# Patient Record
Sex: MALE | Race: WHITE | URBAN - METROPOLITAN AREA
[De-identification: names, ages, dates, MRNs, and addresses within clinical notes are randomized per-mention and may not be internally consistent; named-entity substitution may affect disease eponyms.]

---

## 2018-04-19 ENCOUNTER — APPOINTMENT (OUTPATIENT)
Age: 83
Setting detail: DERMATOLOGY
End: 2018-04-20

## 2018-04-19 VITALS
HEIGHT: 70 IN | SYSTOLIC BLOOD PRESSURE: 156 MMHG | DIASTOLIC BLOOD PRESSURE: 74 MMHG | WEIGHT: 183 LBS | HEART RATE: 74 BPM

## 2018-04-19 VITALS — HEART RATE: 68 BPM | SYSTOLIC BLOOD PRESSURE: 151 MMHG | DIASTOLIC BLOOD PRESSURE: 73 MMHG

## 2018-04-19 PROBLEM — C44.42 SQUAMOUS CELL CARCINOMA OF SKIN OF SCALP AND NECK: Status: ACTIVE | Noted: 2018-04-19

## 2018-04-19 PROCEDURE — OTHER PRESCRIPTION: OTHER

## 2018-04-19 PROCEDURE — OTHER MOHS SURGERY: OTHER

## 2018-04-19 PROCEDURE — 17311 MOHS 1 STAGE H/N/HF/G: CPT

## 2018-04-19 PROCEDURE — 17312 MOHS ADDL STAGE: CPT

## 2018-04-19 PROCEDURE — 14021 TIS TRNFR S/A/L 10.1-30 SQCM: CPT

## 2018-04-19 PROCEDURE — OTHER CONSULTATION FOR MOHS SURGERY: OTHER

## 2018-04-19 RX ORDER — CEPHALEXIN 500 MG/1
CAPSULE ORAL
Qty: 20 | Refills: 0 | Status: ERX | COMMUNITY
Start: 2018-04-19

## 2018-04-19 NOTE — PROCEDURE: MOHS SURGERY
Stage 8: Additional Anesthesia Type: 1% lidocaine with 1:100,000 epinephrine and a 1:10 solution of 8.4% sodium bicarbonate

## 2018-04-19 NOTE — PROCEDURE: CONSULTATION FOR MOHS SURGERY
Name Of The Referring Provider For Procedure: Dr. Barboza
Body Location Override (Optional - Billing Will Still Be Based On Selected Body Map Location If Applicable): scalp
Detail Level: Detailed
Size Of Lesion: 2
X Size Of Lesion In Cm (Optional): 1.7
Location Indication Override (Is Already Calculated Based On Selected Body Location): Area M
Incorporate Mauc In Note: Yes

## 2018-04-19 NOTE — PROCEDURE: MOHS SURGERY
Body Location Override (Optional - Billing Will Still Be Based On Selected Body Map Location If Applicable): scalp

## 2018-05-07 ENCOUNTER — APPOINTMENT (OUTPATIENT)
Age: 83
Setting detail: DERMATOLOGY
End: 2018-05-08

## 2018-05-07 VITALS — DIASTOLIC BLOOD PRESSURE: 65 MMHG | HEIGHT: 70.5 IN | WEIGHT: 181 LBS | SYSTOLIC BLOOD PRESSURE: 132 MMHG

## 2018-05-07 DIAGNOSIS — Z48.817 ENCOUNTER FOR SURGICAL AFTERCARE FOLLOWING SURGERY ON THE SKIN AND SUBCUTANEOUS TISSUE: ICD-10-CM

## 2018-05-07 PROCEDURE — OTHER PATIENT SPECIFIC COUNSELING: OTHER

## 2018-05-07 PROCEDURE — OTHER ORDER TESTS: OTHER

## 2018-05-07 PROCEDURE — OTHER MIPS QUALITY: OTHER

## 2018-05-07 PROCEDURE — OTHER COUNSELING: OTHER

## 2018-05-07 ASSESSMENT — LOCATION DETAILED DESCRIPTION DERM: LOCATION DETAILED: RIGHT SUPERIOR OCCIPITAL SCALP

## 2018-05-07 ASSESSMENT — LOCATION ZONE DERM: LOCATION ZONE: SCALP

## 2018-05-07 ASSESSMENT — PAIN INTENSITY VAS: HOW INTENSE IS YOUR PAIN 0 BEING NO PAIN, 10 BEING THE MOST SEVERE PAIN POSSIBLE?: NO PAIN

## 2018-05-07 ASSESSMENT — LOCATION SIMPLE DESCRIPTION DERM: LOCATION SIMPLE: POSTERIOR SCALP

## 2018-05-07 NOTE — PROCEDURE: PATIENT SPECIFIC COUNSELING
Due to current mild dehiscence, suture removal has been deferred for two days to reduce risk of total wound dehiscence. Culture has been performed to eliminate and determine any potential infection. Continue to follow wound care until suture removal on May 9, 2018.
Detail Level: Detailed

## 2018-05-08 ENCOUNTER — RX ONLY (RX ONLY)
Age: 83
End: 2018-05-08

## 2018-05-08 RX ORDER — MUPIROCIN 20 MG/G
OINTMENT TOPICAL
Qty: 1 | Refills: 1 | Status: ERX | COMMUNITY
Start: 2018-05-08

## 2018-05-09 ENCOUNTER — APPOINTMENT (OUTPATIENT)
Age: 83
Setting detail: DERMATOLOGY
End: 2018-05-14

## 2018-05-09 VITALS
HEIGHT: 72 IN | HEART RATE: 64 BPM | DIASTOLIC BLOOD PRESSURE: 62 MMHG | WEIGHT: 181 LBS | SYSTOLIC BLOOD PRESSURE: 124 MMHG

## 2018-05-09 DIAGNOSIS — Z48.817 ENCOUNTER FOR SURGICAL AFTERCARE FOLLOWING SURGERY ON THE SKIN AND SUBCUTANEOUS TISSUE: ICD-10-CM

## 2018-05-09 PROCEDURE — OTHER PATIENT SPECIFIC COUNSELING: OTHER

## 2018-05-09 PROCEDURE — OTHER TREATMENT REGIMEN: OTHER

## 2018-05-09 PROCEDURE — OTHER COUNSELING: OTHER

## 2018-05-09 PROCEDURE — OTHER MIPS QUALITY: OTHER

## 2018-05-09 ASSESSMENT — PAIN INTENSITY VAS: HOW INTENSE IS YOUR PAIN 0 BEING NO PAIN, 10 BEING THE MOST SEVERE PAIN POSSIBLE?: NO PAIN

## 2018-05-09 ASSESSMENT — LOCATION SIMPLE DESCRIPTION DERM: LOCATION SIMPLE: POSTERIOR SCALP

## 2018-05-09 ASSESSMENT — LOCATION ZONE DERM: LOCATION ZONE: SCALP

## 2018-05-09 ASSESSMENT — LOCATION DETAILED DESCRIPTION DERM: LOCATION DETAILED: RIGHT OCCIPITAL SCALP

## 2018-05-09 NOTE — PROCEDURE: PATIENT SPECIFIC COUNSELING
Due to mild wound dehiscence, suture removal has been deferred. Patient is scheduled to follow up for potential suture removal Friday May 9, 2018. Instructed to continue wound care with hydrogen peroxide, followed by application of mupirocin 2% ointment and a bandage once daily. Advised to contact office in the event that the wound begin to omit symptoms such as pain, itching, warmth to the touch or odorous discharge.
Detail Level: Detailed

## 2018-05-10 ENCOUNTER — RX ONLY (RX ONLY)
Age: 83
End: 2018-05-10

## 2018-05-10 RX ORDER — SULFAMETHOXAZOLE AND TRIMETHOPRIM 400; 80 MG/1; MG/1
TABLET ORAL
Qty: 20 | Refills: 0 | Status: ERX | COMMUNITY
Start: 2018-05-10

## 2018-05-11 ENCOUNTER — APPOINTMENT (OUTPATIENT)
Age: 83
Setting detail: DERMATOLOGY
End: 2018-05-14

## 2018-05-11 VITALS
HEART RATE: 62 BPM | WEIGHT: 181 LBS | HEIGHT: 71 IN | DIASTOLIC BLOOD PRESSURE: 73 MMHG | SYSTOLIC BLOOD PRESSURE: 149 MMHG

## 2018-05-11 DIAGNOSIS — K6811 OTHER POSTOPERATIVE INFECTION: ICD-10-CM

## 2018-05-11 DIAGNOSIS — T814XXA OTHER POSTOPERATIVE INFECTION: ICD-10-CM

## 2018-05-11 PROBLEM — T81.4XXD INFECTION FOLLOWING A PROCEDURE, SUBSEQUENT ENCOUNTER: Status: ACTIVE | Noted: 2018-05-11

## 2018-05-11 PROCEDURE — OTHER MIPS QUALITY: OTHER

## 2018-05-11 PROCEDURE — OTHER SUTURE REMOVAL (GLOBAL PERIOD): OTHER

## 2018-05-11 PROCEDURE — OTHER PATIENT SPECIFIC COUNSELING: OTHER

## 2018-05-11 PROCEDURE — OTHER DRESSING CHANGE: OTHER

## 2018-05-11 PROCEDURE — OTHER TREATMENT REGIMEN: OTHER

## 2018-05-11 ASSESSMENT — LOCATION DETAILED DESCRIPTION DERM: LOCATION DETAILED: MID-OCCIPITAL SCALP

## 2018-05-11 ASSESSMENT — LOCATION ZONE DERM: LOCATION ZONE: SCALP

## 2018-05-11 ASSESSMENT — LOCATION SIMPLE DESCRIPTION DERM: LOCATION SIMPLE: POSTERIOR SCALP

## 2018-05-11 NOTE — PROCEDURE: TREATMENT REGIMEN
Detail Level: Zone
Continue Regimen: Bactrim 400 mg-80 mg tablet twice daily by mouth for ten days \\nmupirocin 2% ointment once daily to wound on scalp

## 2018-05-11 NOTE — PROCEDURE: PATIENT SPECIFIC COUNSELING
Explained to patient that there is slight dehiscence in the center of the wound and advised that he exercise caution when sleeping and dressing the wound. Sutures are to be removed today and patient is to continue his oral antibiotic course as well as wound care with use of topical mupirocin 2% ointment daily. Wound is growing Enterobacter aerogenes (normally gut melissa) susceptible to Bactrim.  The site will be re-evaluated in one week.
Detail Level: Detailed

## 2018-05-11 NOTE — PROCEDURE: SUTURE REMOVAL (GLOBAL PERIOD)
Detail Level: Detailed
Add 14281 Cpt? (Important Note: In 2017 The Use Of 78486 Is Being Tracked By Cms To Determine Future Global Period Reimbursement For Global Periods): no

## 2018-05-11 NOTE — PROCEDURE: DRESSING CHANGE
Add 30918 Cpt? (Important Note: In 2017 The Use Of 59078 Is Being Tracked By Cms To Determine Future Global Period Reimbursement For Global Periods): no
Wound Evaluated By: Dr. Monroy
Detail Level: Detailed

## 2018-05-16 ENCOUNTER — APPOINTMENT (OUTPATIENT)
Age: 83
Setting detail: DERMATOLOGY
End: 2018-05-19

## 2018-05-16 VITALS
HEART RATE: 68 BPM | DIASTOLIC BLOOD PRESSURE: 70 MMHG | WEIGHT: 181 LBS | HEIGHT: 71 IN | SYSTOLIC BLOOD PRESSURE: 139 MMHG

## 2018-05-16 DIAGNOSIS — S31000A OPEN WOUND(S) (MULTIPLE) OF UNSPECIFIED SITE(S), WITHOUT MENTION OF COMPLICATION: ICD-10-CM

## 2018-05-16 PROBLEM — S01.00XS UNSPECIFIED OPEN WOUND OF SCALP, SEQUELA: Status: ACTIVE | Noted: 2018-05-16

## 2018-05-16 PROCEDURE — OTHER DRESSING CHANGE: OTHER

## 2018-05-16 PROCEDURE — OTHER MIPS QUALITY: OTHER

## 2018-05-16 PROCEDURE — OTHER TREATMENT REGIMEN: OTHER

## 2018-05-16 PROCEDURE — OTHER PATIENT SPECIFIC COUNSELING: OTHER

## 2018-05-16 ASSESSMENT — LOCATION ZONE DERM: LOCATION ZONE: SCALP

## 2018-05-16 ASSESSMENT — LOCATION SIMPLE DESCRIPTION DERM: LOCATION SIMPLE: POSTERIOR SCALP

## 2018-05-16 ASSESSMENT — LOCATION DETAILED DESCRIPTION DERM: LOCATION DETAILED: MID-OCCIPITAL SCALP

## 2018-05-16 NOTE — PROCEDURE: DRESSING CHANGE
Detail Level: Detailed
Wound Evaluated By: Dr. Monroy
Add 17022 Cpt? (Important Note: In 2017 The Use Of 71118 Is Being Tracked By Cms To Determine Future Global Period Reimbursement For Global Periods): no

## 2018-05-16 NOTE — PROCEDURE: PATIENT SPECIFIC COUNSELING
Reassured patient that there is no evidence of infection any more.  Further explained the site will continue to heal over with time and patient is to continue wound care and antibiotic regimen. We will re-evaluate in two days.
Detail Level: Detailed

## 2018-05-18 ENCOUNTER — APPOINTMENT (OUTPATIENT)
Age: 83
Setting detail: DERMATOLOGY
End: 2018-05-19

## 2018-05-18 VITALS
SYSTOLIC BLOOD PRESSURE: 119 MMHG | HEART RATE: 67 BPM | DIASTOLIC BLOOD PRESSURE: 60 MMHG | HEIGHT: 71 IN | WEIGHT: 181 LBS

## 2018-05-18 DIAGNOSIS — S31000A OPEN WOUND(S) (MULTIPLE) OF UNSPECIFIED SITE(S), WITHOUT MENTION OF COMPLICATION: ICD-10-CM

## 2018-05-18 PROBLEM — S01.00XS UNSPECIFIED OPEN WOUND OF SCALP, SEQUELA: Status: ACTIVE | Noted: 2018-05-18

## 2018-05-18 PROCEDURE — OTHER TREATMENT REGIMEN: OTHER

## 2018-05-18 PROCEDURE — OTHER PATIENT SPECIFIC COUNSELING: OTHER

## 2018-05-18 PROCEDURE — OTHER MIPS QUALITY: OTHER

## 2018-05-18 PROCEDURE — OTHER COUNSELING: OTHER

## 2018-05-18 ASSESSMENT — LOCATION DETAILED DESCRIPTION DERM: LOCATION DETAILED: MID-OCCIPITAL SCALP

## 2018-05-18 ASSESSMENT — LOCATION SIMPLE DESCRIPTION DERM: LOCATION SIMPLE: POSTERIOR SCALP

## 2018-05-18 ASSESSMENT — LOCATION ZONE DERM: LOCATION ZONE: SCALP

## 2018-05-18 NOTE — PROCEDURE: PATIENT SPECIFIC COUNSELING
Detail Level: Detailed
Reassured patient that wound is healing well. Continue wound care pertaining of cleansing of the wound with hydrogen peroxide, followed by application of mupirocin and a bandage once daily.

## 2018-05-18 NOTE — PROCEDURE: TREATMENT REGIMEN
Continue Regimen: Bactrim 400 mg- 80 mg tablet one pill twice a day for ten days, until course is completed \\nmupirocin 2% ointment once a day to wound on scalp
Detail Level: Zone

## 2018-07-16 NOTE — PROCEDURE: MOHS SURGERY
Patient states he has turkey mites and needs prednisone. Stage 15: Additional Anesthesia Type: 1% lidocaine with epinephrine

## 2019-09-27 ENCOUNTER — APPOINTMENT (OUTPATIENT)
Dept: URBAN - METROPOLITAN AREA CLINIC 286 | Age: 84
Setting detail: DERMATOLOGY
End: 2019-09-28

## 2019-09-27 VITALS
WEIGHT: 185 LBS | SYSTOLIC BLOOD PRESSURE: 152 MMHG | DIASTOLIC BLOOD PRESSURE: 79 MMHG | HEART RATE: 59 BPM | HEIGHT: 70.5 IN

## 2019-09-27 DIAGNOSIS — L81.4 OTHER MELANIN HYPERPIGMENTATION: ICD-10-CM

## 2019-09-27 DIAGNOSIS — L57.0 ACTINIC KERATOSIS: ICD-10-CM

## 2019-09-27 DIAGNOSIS — L82.1 OTHER SEBORRHEIC KERATOSIS: ICD-10-CM

## 2019-09-27 PROBLEM — D48.5 NEOPLASM OF UNCERTAIN BEHAVIOR OF SKIN: Status: ACTIVE | Noted: 2019-09-27

## 2019-09-27 PROCEDURE — 99213 OFFICE O/P EST LOW 20 MIN: CPT | Mod: 25

## 2019-09-27 PROCEDURE — 17003 DESTRUCT PREMALG LES 2-14: CPT

## 2019-09-27 PROCEDURE — 17000 DESTRUCT PREMALG LESION: CPT | Mod: 59

## 2019-09-27 PROCEDURE — 11103 TANGNTL BX SKIN EA SEP/ADDL: CPT

## 2019-09-27 PROCEDURE — OTHER REASSURANCE: OTHER

## 2019-09-27 PROCEDURE — OTHER PATIENT SPECIFIC COUNSELING: OTHER

## 2019-09-27 PROCEDURE — OTHER BIOPSY BY SHAVE METHOD: OTHER

## 2019-09-27 PROCEDURE — OTHER MIPS QUALITY: OTHER

## 2019-09-27 PROCEDURE — OTHER COUNSELING: OTHER

## 2019-09-27 PROCEDURE — OTHER LIQUID NITROGEN: OTHER

## 2019-09-27 PROCEDURE — 11102 TANGNTL BX SKIN SINGLE LES: CPT

## 2019-09-27 PROCEDURE — OTHER DEFER: OTHER

## 2019-09-27 ASSESSMENT — LOCATION SIMPLE DESCRIPTION DERM
LOCATION SIMPLE: SCALP
LOCATION SIMPLE: LEFT UPPER BACK
LOCATION SIMPLE: RIGHT UPPER BACK
LOCATION SIMPLE: CHEST
LOCATION SIMPLE: LEFT CHEEK
LOCATION SIMPLE: LEFT UPPER ARM
LOCATION SIMPLE: LEFT EAR
LOCATION SIMPLE: RIGHT EAR
LOCATION SIMPLE: RIGHT LOWER BACK
LOCATION SIMPLE: RIGHT CHEEK
LOCATION SIMPLE: UPPER BACK
LOCATION SIMPLE: ABDOMEN
LOCATION SIMPLE: RIGHT UPPER ARM
LOCATION SIMPLE: LEFT SCALP

## 2019-09-27 ASSESSMENT — LOCATION ZONE DERM
LOCATION ZONE: FACE
LOCATION ZONE: ARM
LOCATION ZONE: EAR
LOCATION ZONE: TRUNK
LOCATION ZONE: SCALP

## 2019-09-27 ASSESSMENT — LOCATION DETAILED DESCRIPTION DERM
LOCATION DETAILED: RIGHT PROXIMAL POSTERIOR UPPER ARM
LOCATION DETAILED: LEFT INFERIOR CENTRAL MALAR CHEEK
LOCATION DETAILED: LEFT SUPERIOR HELIX
LOCATION DETAILED: LEFT MEDIAL FRONTAL SCALP
LOCATION DETAILED: RIGHT SUPERIOR PARIETAL SCALP
LOCATION DETAILED: PERIUMBILICAL SKIN
LOCATION DETAILED: RIGHT MEDIAL SUPERIOR CHEST
LOCATION DETAILED: LEFT SUPERIOR UPPER BACK
LOCATION DETAILED: INFERIOR THORACIC SPINE
LOCATION DETAILED: RIGHT ANTERIOR DISTAL UPPER ARM
LOCATION DETAILED: RIGHT MID-UPPER BACK
LOCATION DETAILED: RIGHT LATERAL UPPER BACK
LOCATION DETAILED: RIGHT INFERIOR CENTRAL MALAR CHEEK
LOCATION DETAILED: LEFT PROXIMAL POSTERIOR UPPER ARM
LOCATION DETAILED: RIGHT SUPERIOR MEDIAL MIDBACK
LOCATION DETAILED: RIGHT SUPERIOR HELIX
LOCATION DETAILED: XIPHOID
LOCATION DETAILED: LEFT ANTERIOR PROXIMAL UPPER ARM

## 2019-09-27 ASSESSMENT — PAIN INTENSITY VAS: HOW INTENSE IS YOUR PAIN 0 BEING NO PAIN, 10 BEING THE MOST SEVERE PAIN POSSIBLE?: NO PAIN

## 2019-09-27 NOTE — PROCEDURE: MIPS QUALITY
Quality 226: Preventive Care And Screening: Tobacco Use: Screening And Cessation Intervention: Patient screened for tobacco use and is an ex/non-smoker
Quality 265: Biopsy Follow-Up: Biopsy results reviewed, communicated, tracked, and documented
Detail Level: Zone
Quality 131: Pain Assessment And Follow-Up: Pain assessment using a standardized tool is documented as negative, no follow-up plan required
Quality 110: Preventive Care And Screening: Influenza Immunization: Influenza immunization was not ordered or administered, reason not given

## 2019-09-27 NOTE — PROCEDURE: BIOPSY BY SHAVE METHOD
Dressing: bandage
Notification Instructions: Patient will be notified of biopsy results. However, patient instructed to call the office if not contacted within 2 weeks.
Destruction After The Procedure: No
Type Of Destruction Used: Curettage
Post-Care Instructions: I reviewed with the patient in detail post-care instructions. Patient is to keep the biopsy site dry overnight, and then apply bacitracin twice daily until healed. Patient may apply hydrogen peroxide soaks to remove any crusting.
Detail Level: Detailed
Biopsy Method: Personna blade
Wound Care: Vaseline
Electrodesiccation Text: The wound bed was treated with electrodesiccation after the biopsy was performed.
Silver Nitrate Text: The wound bed was treated with silver nitrate after the biopsy was performed.
Anesthesia Volume In Cc (Will Not Render If 0): 0.5
Additional Anesthesia Volume In Cc (Will Not Render If 0): 0
Was A Bandage Applied: Yes
Curettage Text: The wound bed was treated with curettage after the biopsy was performed.
Billing Type: Third-Party Bill
Depth Of Biopsy: dermis
Anesthesia Type: 1% lidocaine with epinephrine and a 1:10 solution of 8.4% sodium bicarbonate
Size Of Lesion In Cm: 1.5
Cryotherapy Text: The wound bed was treated with cryotherapy after the biopsy was performed.
Biopsy Type: H and E
Electrodesiccation And Curettage Text: The wound bed was treated with electrodesiccation and curettage after the biopsy was performed.
Consent: Written consent was obtained and risks were reviewed including but not limited to scarring, infection, bleeding, scabbing, incomplete removal, nerve damage and allergy to anesthesia.
X Size Of Lesion In Cm: 1
Hemostasis: Electrocautery

## 2019-09-27 NOTE — PROCEDURE: LIQUID NITROGEN
Consent: The patient's consent was obtained including but not limited to risks of crusting, scabbing, blistering, scarring, darker or lighter pigmentary change, recurrence, incomplete removal and infection.
Post-Care Instructions: I reviewed with the patient in detail post-care instructions. Patient is to wear sunprotection, and avoid picking at any of the treated lesions. Pt may apply Vaseline to crusted or scabbing areas.
Duration Of Freeze Thaw-Cycle (Seconds): 5
Number Of Freeze-Thaw Cycles: 2 freeze-thaw cycles
Detail Level: Detailed
Render Note In Bullet Format When Appropriate: No

## 2019-09-27 NOTE — PROCEDURE: PATIENT SPECIFIC COUNSELING
Lesions are pre-malignant and require further treatment to prevent future malignancy. Due to the multitude of lesions located across (the face, scalp or arms), spot treatment with liquid nitrogen would not be as effective as a field treatment option. Discussed treatment options including topical Efudex, or in office photodynamic therapy. Explained to patient that photodynamic therapy will require sun avoidance for two days following treatment. Photodynamic therapy reactions explained including redness, peeling, scabbing and swelling. Reactions may vary and may last from one to three weeks following treatment. Risks and benefits of PDT blue light therapy discussed in great detail, and all questions have been answered. I recommend that the patient have treatment to his scalp first, then treat his face and ears.
Detail Level: Simple

## 2019-10-30 ENCOUNTER — APPOINTMENT (OUTPATIENT)
Dept: URBAN - METROPOLITAN AREA CLINIC 286 | Age: 84
Setting detail: DERMATOLOGY
End: 2019-10-30

## 2019-10-30 VITALS — DIASTOLIC BLOOD PRESSURE: 66 MMHG | SYSTOLIC BLOOD PRESSURE: 137 MMHG | HEART RATE: 60 BPM

## 2019-10-30 VITALS
DIASTOLIC BLOOD PRESSURE: 82 MMHG | HEIGHT: 70 IN | WEIGHT: 184 LBS | SYSTOLIC BLOOD PRESSURE: 150 MMHG | HEART RATE: 62 BPM

## 2019-10-30 PROBLEM — C44.619 BASAL CELL CARCINOMA OF SKIN OF LEFT UPPER LIMB, INCLUDING SHOULDER: Status: ACTIVE | Noted: 2019-10-30

## 2019-10-30 PROBLEM — C44.519 BASAL CELL CARCINOMA OF SKIN OF OTHER PART OF TRUNK: Status: ACTIVE | Noted: 2019-10-30

## 2019-10-30 PROCEDURE — 13121 CMPLX RPR S/A/L 2.6-7.5 CM: CPT | Mod: 59

## 2019-10-30 PROCEDURE — OTHER CURETTAGE AND DESTRUCTION: OTHER

## 2019-10-30 PROCEDURE — 17314 MOHS ADDL STAGE T/A/L: CPT

## 2019-10-30 PROCEDURE — OTHER PRESCRIPTION: OTHER

## 2019-10-30 PROCEDURE — OTHER MOHS SURGERY: OTHER

## 2019-10-30 PROCEDURE — 17313 MOHS 1 STAGE T/A/L: CPT

## 2019-10-30 PROCEDURE — 17262 DSTRJ MAL LES T/A/L 1.1-2.0: CPT | Mod: 59

## 2019-10-30 RX ORDER — CEPHALEXIN 500 MG/1
CAPSULE ORAL
Qty: 14 | Refills: 0 | Status: ERX

## 2019-10-30 NOTE — PROCEDURE: CURETTAGE AND DESTRUCTION
Render Post-Care Instructions In Note?: no
Anesthesia Type: 1% lidocaine with epinephrine and a 1:10 solution of 8.4% sodium bicarbonate
Size Of Lesion After Curettage: 1.6
Concentration (Mg/Ml Or Millions Of Plaque Forming Units/Cc): 0.01
What Was Performed First?: Curettage
Consent was obtained from the patient. The risks, benefits and alternatives to therapy were discussed in detail. Specifically, the risks of infection, scarring, bleeding, prolonged wound healing, nerve injury, incomplete removal, allergy to anesthesia and recurrence were addressed. Alternatives to ED&C, such as: surgical removal and XRT were also discussed.  Prior to the procedure, the treatment site was clearly identified and confirmed by the patient. All components of Universal Protocol/PAUSE Rule completed.
Cautery Type: electrodesiccation
Anesthesia Volume In Cc: 3
Size Of Lesion In Cm: 1
Post-Care Instructions: I reviewed with the patient in detail post-care instructions. Patient is to keep the area dry for 48 hours, and not to engage in any swimming until the area is healed. Should the patient develop any fevers, chills, bleeding, severe pain patient will contact the office immediately.
Additional Information: (Optional): The wound was cleaned, and a pressure dressing was applied.  The patient received detailed post-op instructions.
Detail Level: Detailed
Bill As A Line Item Or As Units: Line Item

## 2019-10-30 NOTE — PROCEDURE: MOHS SURGERY
Partial Purse String (Intermediate) Text: Given the location of the defect and the characteristics of the surrounding skin an intermediate purse string closure was deemed most appropriate.  Undermining was performed circumfirentially around the surgical defect.  A purse string suture was then placed and tightened. Wound tension only allowed a partial closure of the circular defect. OR Nurse Turner

## 2019-10-30 NOTE — PROCEDURE: MOHS SURGERY
see md note O-T Plasty Text: The defect edges were debeveled with a #15 scalpel blade.  Given the location of the defect, shape of the defect and the proximity to free margins an O-T plasty was deemed most appropriate.  Using a sterile surgical marker, an appropriate O-T plasty was drawn incorporating the defect and placing the expected incisions within the relaxed skin tension lines where possible.    The area thus outlined was incised deep to adipose tissue with a #15 scalpel blade.  The skin margins were undermined to an appropriate distance in all directions utilizing iris scissors.

## 2019-11-14 ENCOUNTER — APPOINTMENT (OUTPATIENT)
Dept: URBAN - METROPOLITAN AREA CLINIC 286 | Age: 84
Setting detail: DERMATOLOGY
End: 2019-11-17

## 2019-11-14 VITALS
SYSTOLIC BLOOD PRESSURE: 118 MMHG | HEIGHT: 70 IN | WEIGHT: 184 LBS | DIASTOLIC BLOOD PRESSURE: 54 MMHG | HEART RATE: 68 BPM

## 2019-11-14 DIAGNOSIS — Z48.02 ENCOUNTER FOR REMOVAL OF SUTURES: ICD-10-CM

## 2019-11-14 DIAGNOSIS — S31000A OPEN WOUND(S) (MULTIPLE) OF UNSPECIFIED SITE(S), WITHOUT MENTION OF COMPLICATION: ICD-10-CM

## 2019-11-14 PROBLEM — C44.612 BASAL CELL CARCINOMA OF SKIN OF RIGHT UPPER LIMB, INCLUDING SHOULDER: Status: ACTIVE | Noted: 2019-11-14

## 2019-11-14 PROBLEM — S21.209S: Status: ACTIVE | Noted: 2019-11-14

## 2019-11-14 PROCEDURE — OTHER COUNSELING: OTHER

## 2019-11-14 PROCEDURE — OTHER SUTURE REMOVAL (GLOBAL PERIOD): OTHER

## 2019-11-14 PROCEDURE — OTHER PATIENT SPECIFIC COUNSELING: OTHER

## 2019-11-14 PROCEDURE — 99213 OFFICE O/P EST LOW 20 MIN: CPT

## 2019-11-14 PROCEDURE — OTHER MIPS QUALITY: OTHER

## 2019-11-14 ASSESSMENT — LOCATION DETAILED DESCRIPTION DERM
LOCATION DETAILED: LEFT DISTAL DORSAL FOREARM
LOCATION DETAILED: LEFT SUPERIOR MEDIAL UPPER BACK

## 2019-11-14 ASSESSMENT — LOCATION SIMPLE DESCRIPTION DERM
LOCATION SIMPLE: LEFT UPPER BACK
LOCATION SIMPLE: LEFT FOREARM

## 2019-11-14 ASSESSMENT — PAIN INTENSITY VAS: HOW INTENSE IS YOUR PAIN 0 BEING NO PAIN, 10 BEING THE MOST SEVERE PAIN POSSIBLE?: NO PAIN

## 2019-11-14 ASSESSMENT — LOCATION ZONE DERM
LOCATION ZONE: ARM
LOCATION ZONE: TRUNK

## 2019-11-14 NOTE — PROCEDURE: PATIENT SPECIFIC COUNSELING
Detail Level: Simple
Explained the etiology of the condition in detail with the patient. Even though the site looks well healed there is underlying skin cancer remaining. Informed the patient that if this lesion is left untreated there is the potential that the skin cancer will continue to grow larger. The patient has expressed understanding and is agreeable. The patient does not elect to have this lesion treated by MOHs surgery at this time.
Reassured the patient that the wound is well healing with no signs of infection or recurrence. Advised the patient to continue wound care until the wound has fully healed. The patient has expressed understanding and is agreeable.

## 2019-11-14 NOTE — PROCEDURE: MIPS QUALITY
Quality 226: Preventive Care And Screening: Tobacco Use: Screening And Cessation Intervention: Patient screened for tobacco use and is an ex/non-smoker
Quality 131: Pain Assessment And Follow-Up: Pain assessment using a standardized tool is documented as negative, no follow-up plan required
Quality 110: Preventive Care And Screening: Influenza Immunization: Influenza Immunization Administered during Influenza season
Detail Level: Zone

## 2019-11-14 NOTE — PROCEDURE: SUTURE REMOVAL (GLOBAL PERIOD)
Add 47037 Cpt? (Important Note: In 2017 The Use Of 61994 Is Being Tracked By Cms To Determine Future Global Period Reimbursement For Global Periods): no
Detail Level: Detailed

## 2019-11-18 ENCOUNTER — APPOINTMENT (OUTPATIENT)
Dept: URBAN - METROPOLITAN AREA CLINIC 286 | Age: 84
Setting detail: DERMATOLOGY
End: 2019-11-18

## 2019-11-18 VITALS
DIASTOLIC BLOOD PRESSURE: 87 MMHG | WEIGHT: 184 LBS | HEART RATE: 68 BPM | SYSTOLIC BLOOD PRESSURE: 151 MMHG | HEIGHT: 70 IN

## 2019-11-18 DIAGNOSIS — L57.0 ACTINIC KERATOSIS: ICD-10-CM

## 2019-11-18 PROCEDURE — OTHER MIPS QUALITY: OTHER

## 2019-11-18 PROCEDURE — 96567 PDT DSTR PRMLG LES SKN: CPT

## 2019-11-18 PROCEDURE — OTHER PDT: BLUE: OTHER

## 2019-11-18 ASSESSMENT — LOCATION DETAILED DESCRIPTION DERM: LOCATION DETAILED: POSTERIOR MID-PARIETAL SCALP

## 2019-11-18 ASSESSMENT — LOCATION ZONE DERM: LOCATION ZONE: SCALP

## 2019-11-18 ASSESSMENT — LOCATION SIMPLE DESCRIPTION DERM: LOCATION SIMPLE: POSTERIOR SCALP

## 2019-11-18 NOTE — PROCEDURE: MIPS QUALITY
Quality 47: Advance Care Plan: Advance Care Planning discussed and documented; advance care plan or surrogate decision maker documented in the medical record.
Quality 131: Pain Assessment And Follow-Up: Pain assessment using a standardized tool is documented as negative, no follow-up plan required
Quality 110: Preventive Care And Screening: Influenza Immunization: Influenza Immunization Administered during Influenza season
Detail Level: Zone
Quality 226: Preventive Care And Screening: Tobacco Use: Screening And Cessation Intervention: Patient screened for tobacco use and is an ex/non-smoker

## 2019-11-18 NOTE — PROCEDURE: PDT: BLUE
Debridement Text (Will Only Render In Visit Note If You Select Debridement Option Under Who Performed The Pdt Field): Prior to application of the photodynamic medication the hyperkeratotic lesions were curetted by Dr. Monroy to make them more amenable to therapy.

## 2019-11-18 NOTE — PROCEDURE: PDT: BLUE
Incubation Start Time: 12:50 PM able to follow single-step instructions/with verbal and tactile stimuli

## 2019-12-05 ENCOUNTER — APPOINTMENT (OUTPATIENT)
Dept: URBAN - METROPOLITAN AREA CLINIC 286 | Age: 84
Setting detail: DERMATOLOGY
End: 2019-12-05

## 2019-12-05 VITALS — SYSTOLIC BLOOD PRESSURE: 122 MMHG | DIASTOLIC BLOOD PRESSURE: 77 MMHG | HEART RATE: 64 BPM

## 2019-12-05 DIAGNOSIS — L85.8 OTHER SPECIFIED EPIDERMAL THICKENING: ICD-10-CM

## 2019-12-05 DIAGNOSIS — L57.0 ACTINIC KERATOSIS: ICD-10-CM

## 2019-12-05 PROBLEM — D48.5 NEOPLASM OF UNCERTAIN BEHAVIOR OF SKIN: Status: ACTIVE | Noted: 2019-12-05

## 2019-12-05 PROCEDURE — 69100 BIOPSY OF EXTERNAL EAR: CPT | Mod: 59

## 2019-12-05 PROCEDURE — 17000 DESTRUCT PREMALG LESION: CPT

## 2019-12-05 PROCEDURE — OTHER COUNSELING: OTHER

## 2019-12-05 PROCEDURE — OTHER BIOPSY BY SHAVE METHOD: OTHER

## 2019-12-05 PROCEDURE — 17003 DESTRUCT PREMALG LES 2-14: CPT

## 2019-12-05 PROCEDURE — 99213 OFFICE O/P EST LOW 20 MIN: CPT | Mod: 25

## 2019-12-05 PROCEDURE — OTHER LIQUID NITROGEN: OTHER

## 2019-12-05 ASSESSMENT — LOCATION DETAILED DESCRIPTION DERM
LOCATION DETAILED: LEFT CENTRAL FRONTAL SCALP
LOCATION DETAILED: LEFT SUPERIOR FOREHEAD
LOCATION DETAILED: RIGHT SUPERIOR FOREHEAD
LOCATION DETAILED: RIGHT MEDIAL FRONTAL SCALP
LOCATION DETAILED: RIGHT SUPERIOR HELIX

## 2019-12-05 ASSESSMENT — LOCATION SIMPLE DESCRIPTION DERM
LOCATION SIMPLE: LEFT SCALP
LOCATION SIMPLE: LEFT FOREHEAD
LOCATION SIMPLE: RIGHT FOREHEAD
LOCATION SIMPLE: RIGHT EAR
LOCATION SIMPLE: RIGHT SCALP

## 2019-12-05 ASSESSMENT — LOCATION ZONE DERM
LOCATION ZONE: FACE
LOCATION ZONE: EAR
LOCATION ZONE: SCALP

## 2019-12-05 NOTE — PROCEDURE: LIQUID NITROGEN
Render Post-Care Instructions In Note?: no
Consent: The patient's consent was obtained including but not limited to risks of crusting, scabbing, blistering, scarring, darker or lighter pigmentary change, recurrence, incomplete removal and infection.
Detail Level: Detailed
Number Of Freeze-Thaw Cycles: 2 freeze-thaw cycles
Post-Care Instructions: I reviewed with the patient in detail post-care instructions. Patient is to wear sunprotection, and avoid picking at any of the treated lesions. Pt may apply Vaseline to crusted or scabbing areas.
Aperture Size (Optional): C
Duration Of Freeze Thaw-Cycle (Seconds): 5

## 2019-12-05 NOTE — PROCEDURE: BIOPSY BY SHAVE METHOD
Bill For Surgical Tray: no
Was A Bandage Applied: Yes
Size Of Lesion In Cm: 2.5
Cryotherapy Text: The wound bed was treated with cryotherapy after the biopsy was performed.
Depth Of Biopsy: dermis
Type Of Destruction Used: Curettage
Hemostasis: Electrocautery
Additional Anesthesia Volume In Cc (Will Not Render If 0): 0
Billing Type: Third-Party Bill
Wound Care: Petrolatum
Dressing: bandage
Notification Instructions: Patient will be notified of biopsy results. However, patient instructed to call the office if not contacted within 2 weeks.
Biopsy Method: Dermablade
Silver Nitrate Text: The wound bed was treated with silver nitrate after the biopsy was performed.
Electrodesiccation Text: The wound bed was treated with electrodesiccation after the biopsy was performed.
Detail Level: Detailed
Biopsy Type: H and E
Anesthesia Type: 1% lidocaine with epinephrine and a 1:10 solution of 8.4% sodium bicarbonate
Curettage Text: The wound bed was treated with curettage after the biopsy was performed.
X Size Of Lesion In Cm: 1
Electrodesiccation And Curettage Text: The wound bed was treated with electrodesiccation and curettage after the biopsy was performed.
Post-Care Instructions: I reviewed with the patient in detail post-care instructions. Patient is to keep the biopsy site dry overnight, and then apply vaseline or aquaphor healing ointment twice daily until healed. Patient may apply hydrogen peroxide soaks to remove any crusting.
Anesthesia Volume In Cc (Will Not Render If 0): 0.5
Consent: Written consent was obtained and risks were reviewed including but not limited to scarring, infection, bleeding, scabbing, incomplete removal, nerve damage and allergy to anesthesia.

## 2019-12-30 ENCOUNTER — APPOINTMENT (OUTPATIENT)
Dept: URBAN - METROPOLITAN AREA CLINIC 286 | Age: 84
Setting detail: DERMATOLOGY
End: 2019-12-30

## 2019-12-30 DIAGNOSIS — L57.0 ACTINIC KERATOSIS: ICD-10-CM

## 2019-12-30 PROCEDURE — 17003 DESTRUCT PREMALG LES 2-14: CPT

## 2019-12-30 PROCEDURE — OTHER LIQUID NITROGEN: OTHER

## 2019-12-30 PROCEDURE — OTHER COUNSELING: OTHER

## 2019-12-30 PROCEDURE — 17000 DESTRUCT PREMALG LESION: CPT

## 2019-12-30 PROCEDURE — OTHER PATIENT SPECIFIC COUNSELING: OTHER

## 2019-12-30 ASSESSMENT — LOCATION SIMPLE DESCRIPTION DERM
LOCATION SIMPLE: RIGHT CHEEK
LOCATION SIMPLE: RIGHT EAR

## 2019-12-30 ASSESSMENT — LOCATION ZONE DERM
LOCATION ZONE: FACE
LOCATION ZONE: EAR

## 2019-12-30 ASSESSMENT — LOCATION DETAILED DESCRIPTION DERM
LOCATION DETAILED: RIGHT SUPERIOR MEDIAL MALAR CHEEK
LOCATION DETAILED: RIGHT SUPERIOR HELIX
LOCATION DETAILED: RIGHT INFERIOR CENTRAL MALAR CHEEK

## 2019-12-30 NOTE — PROCEDURE: PATIENT SPECIFIC COUNSELING
Detail Level: Zone
Explained that biopsy site is still healing so cryotherapy is not recommended at this time. Advised continued observation of site.

## 2019-12-30 NOTE — PROCEDURE: LIQUID NITROGEN
Consent: The patient's consent was obtained including but not limited to risks of crusting, scabbing, blistering, scarring, darker or lighter pigmentary change, recurrence, incomplete removal and infection.
Aperture Size (Optional): C
Duration Of Freeze Thaw-Cycle (Seconds): 5
Render Post-Care Instructions In Note?: no
Number Of Freeze-Thaw Cycles: 2 freeze-thaw cycles
Detail Level: Detailed
Post-Care Instructions: I reviewed with the patient in detail post-care instructions. Patient is to wear sunprotection, and avoid picking at any of the treated lesions. Pt may apply Vaseline to crusted or scabbing areas.
Coronary artery disease of native artery of native heart with stable angina pectoris

## 2023-11-29 NOTE — PROCEDURE: MOHS SURGERY
Eladio Camejo([place]) Bilateral Helical Rim Advancement Flap Text: The defect edges were debeveled with a #15 blade scalpel.  Given the location of the defect and the proximity to free margins (helical rim) a bilateral helical rim advancement flap was deemed most appropriate.  Using a sterile surgical marker, the appropriate advancement flaps were drawn incorporating the defect and placing the expected incisions between the helical rim and antihelix where possible.  The area thus outlined was incised through and through with a #15 scalpel blade.  With a skin hook and iris scissors, the flaps were gently and sharply undermined and freed up.

## 2024-09-12 NOTE — PROCEDURE: MOHS SURGERY
declines Post-Care Instructions: I reviewed with the patient in detail post-care instructions. Patient is not to engage in any heavy lifting, exercise, or swimming for the next 14 days. Should the patient develop any fevers, chills, bleeding, severe pain patient will contact the office immediately.